# Patient Record
Sex: MALE | Race: WHITE | NOT HISPANIC OR LATINO | URBAN - METROPOLITAN AREA
[De-identification: names, ages, dates, MRNs, and addresses within clinical notes are randomized per-mention and may not be internally consistent; named-entity substitution may affect disease eponyms.]

---

## 2024-03-13 ENCOUNTER — APPOINTMENT (RX ONLY)
Dept: URBAN - METROPOLITAN AREA CLINIC 149 | Facility: CLINIC | Age: 59
Setting detail: DERMATOLOGY
End: 2024-03-13

## 2024-03-13 DIAGNOSIS — Z71.89 OTHER SPECIFIED COUNSELING: ICD-10-CM

## 2024-03-13 DIAGNOSIS — L30.9 DERMATITIS, UNSPECIFIED: ICD-10-CM

## 2024-03-13 DIAGNOSIS — L30.0 NUMMULAR DERMATITIS: ICD-10-CM

## 2024-03-13 PROCEDURE — ? PRESCRIPTION MEDICATION MANAGEMENT

## 2024-03-13 PROCEDURE — ? TREATMENT REGIMEN

## 2024-03-13 PROCEDURE — ? DIAGNOSIS COMMENT

## 2024-03-13 PROCEDURE — ? PRESCRIPTION

## 2024-03-13 PROCEDURE — ? COUNSELING

## 2024-03-13 PROCEDURE — 99214 OFFICE O/P EST MOD 30 MIN: CPT

## 2024-03-13 PROCEDURE — ? EDUCATIONAL RESOURCES PROVIDED

## 2024-03-13 RX ORDER — TRIAMCINOLONE ACETONIDE 1 MG/G
CREAM TOPICAL
Qty: 45 | Refills: 1 | Status: ERX | COMMUNITY
Start: 2024-03-13

## 2024-03-13 RX ORDER — FLUOCINONIDE 0.5 MG/G
CREAM TOPICAL
Qty: 60 | Refills: 1 | Status: ERX | COMMUNITY
Start: 2024-03-13

## 2024-03-13 RX ADMIN — TRIAMCINOLONE ACETONIDE: 1 CREAM TOPICAL at 00:00

## 2024-03-13 RX ADMIN — FLUOCINONIDE: 0.5 CREAM TOPICAL at 00:00

## 2024-03-13 NOTE — PROCEDURE: DIAGNOSIS COMMENT
Render Risk Assessment In Note?: no
Detail Level: Zone
Comment: New rash of unk cause\\nCould be \\nNummular Eczema\\nXerotic Eczema\\nAllergic Contact dermatitis to diclofenac\\nHe get hives from oral diclofenac, the tolerated rx diclofenac cream well but generic diclofenac ointment made him have hives when applied to legs and this new rash came up afterwards.

## 2024-03-13 NOTE — PROCEDURE: TREATMENT REGIMEN
Plan: Medication Management for problems addressed today with medication is listed here or are under patient photos where the treatment instructions were scanned or photo uploaded. In addition to the medication plan this may contain a brief description of the problem(s) addressed and other details of the problem(s). Plans may or may not be listed under individual impressions in other sections but are  out here by problem.\\nA printed copy of the Prescription Medication Management or treatment instruction sheet was given to the patient or can be downloaded from our portal.
Detail Level: Detailed
Initiate Treatment: We may outline other treatment but as a minimum\\nClean your rash areas with suggested cleanser or soap at least once per day before applying medicine.  AM and PM washing is best.  \\nSuggested cleanser or soap is(are)\\nGentle soap such as Dove unscented, CeraVe Cleanser, or Cetaphil Cleanser\\n\\nAM and PM\\nCeraVe Cream or Violet cream OTC over any prescription creams given AM & PM, CeraVe is preferable\\n\\Marcello needed for itch Sarna original lotion OTC